# Patient Record
Sex: FEMALE | Race: WHITE | NOT HISPANIC OR LATINO | Employment: FULL TIME | URBAN - METROPOLITAN AREA
[De-identification: names, ages, dates, MRNs, and addresses within clinical notes are randomized per-mention and may not be internally consistent; named-entity substitution may affect disease eponyms.]

---

## 2024-04-29 ENCOUNTER — THERAPY VISIT (OUTPATIENT)
Dept: PHYSICAL THERAPY | Facility: CLINIC | Age: 49
End: 2024-04-29
Payer: COMMERCIAL

## 2024-04-29 DIAGNOSIS — R26.89 IMPAIRMENT OF BALANCE: ICD-10-CM

## 2024-04-29 DIAGNOSIS — G93.89 BRAIN MASS: Primary | ICD-10-CM

## 2024-04-29 DIAGNOSIS — R26.81 UNSTEADY GAIT: ICD-10-CM

## 2024-04-29 DIAGNOSIS — D33.3 RIGHT ACOUSTIC NEUROMA (H): ICD-10-CM

## 2024-04-29 PROCEDURE — 97161 PT EVAL LOW COMPLEX 20 MIN: CPT | Mod: GP | Performed by: PHYSICAL THERAPIST

## 2024-04-29 PROCEDURE — 97112 NEUROMUSCULAR REEDUCATION: CPT | Mod: GP | Performed by: PHYSICAL THERAPIST

## 2024-04-29 NOTE — PROGRESS NOTES
PHYSICAL THERAPY EVALUATION  Type of Visit: Evaluation    See electronic medical record for Abuse and Falls Screening details.    Subjective       Presenting condition or subjective complaint: vestibular therapy after surgery.   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  She underwent right frontal EVD placement on 4/11. She subsequently underwent right retrosigmoid craniotomy with tumor resection on 4/17. Postoperatively, she was noted to have right facial paresis with HB3. Postoperative MRI demonstrated good tumor resection with very small residual tumor along the right CN VII. Unfortunately, she began to have significant CSF drainage from her right frontal incision, so a left parietal  shunt placement on 4/23. Since discharging home on 4/24, she feels her balance has slowly been improving and improvement in nystagmus.  She notices weakness and fatigue since surgery.  Denies falls since surgery.  Date of onset: 04/17/24    Relevant medical history: Dizziness   Past Medical History:   Diagnosis Date    Anemia      Dates & types of surgery: april 11, 17 and 23 EVD, acoustic neuroma resection and shunt    Prior diagnostic imaging/testing results: MRI; CT scan     Prior therapy history for the same diagnosis, illness or injury:           VESTIBULAR EVALUATION  ADDITIONAL HISTORY:  Description of symptoms: Off balance  Dizzy attacks:   Start: i can feel it is improving as time passes   Last attack:     Frequency of occurrences:     Length of attack:    Difficulty hearing: Right ear  Noise in ears? Yes some white noise  not a big problem  Alleviates symptoms:    Worsens symptoms: movement and  turning  Activities that bring on symptoms:       Pertinent visual history:   Pertinent history of current vestibular problem: Hearing loss  DHI:  54/100    Prior Level of Function  Transfers: Independent  Ambulation: Independent  ADL: Independent  IADL: Driving, Housekeeping, Meal preparation    Living Environment  Social support: With a significant other or spouse   Type of home: House   Stairs to enter the home:         Ramp: No   Stairs inside the home: Yes 6 Is there a railing: Yes   Help at home: Home management tasks (cooking, cleaning); Home and Yard maintenance tasks; Assist for driving and community activities; Meals on wheels/meal service  Equipment owned:       Employment:      Hobbies/Interests:  plays the [employer] and singing at [employer].  Enjoys music, art, cooking.     Patient goals for therapy: stonger and better balance    Pain assessment: Pain denied     Objective     POSTURE: WFL  RANGE OF MOTION: LE ROM WNL  STRENGTH:  Not formally tested; >3+/5 upon observation of functional mobility  BED MOBILITY:  NT    TRANSFERS: Independent    GAIT:   Gait Description: reciprocal gait, slow speed, cautious with movements and avoided head turns    BALANCE:     SPECIAL TESTS  Functional Gait Assessment (FGA) TOTAL SCORE: (MAXIMUM SCORE 30): 13  (<22/30 indicates fall risk)     Modified CTSIB Conditions (sec) Cond 1: 30  Cond 2: 30s - moderate sway  Cond 4: 30s - moderate sway  Cond 5 : unable - immediate LOB to the left         SENSATION: LE Sensation WNL  COORDINATION: intact to rapid alt toe taps    VESTIBULAR  Cervicogenic Screen    Neck ROM Normal     Oculomotor Screen    Ocular ROM Normal   Smooth Pursuit Normal   Saccades Normal   VOR Normal   VOR Cancellation Normal   Head Impulse Test Positive on R   Convergence Testing           Dynamic Visual Acuity (DVA)    Static Acuity (LogMar) Deferred to future session   Horizontal Head Movement at 1 Hz (LogMar)    Horizontal Head Movement at 2 Hz (LogMar)              Assessment & Plan   CLINICAL IMPRESSIONS  Medical Diagnosis: Mass brain; acoustic neuroma resection    Treatment Diagnosis: impaired balance, unsteady gait   Impression/Assessment: Patient is a 48 year old female with balance and unsteady gait complaints s/p acoustic neuroma resection.  The following significant findings have been identified: Impaired balance, Impaired gait, Decreased activity tolerance, Instability, Dizziness, and Disequilibrium . These impairments interfere with their ability to perform self care tasks, recreational activities, household chores, driving , household mobility, and community mobility as compared to previous level of function.     Clinical Decision Making (Complexity):  Clinical Presentation: Stable/Uncomplicated  Clinical Presentation Rationale: based on medical and personal factors listed in PT evaluation  Clinical Decision Making (Complexity): Low complexity    PLAN OF CARE  Treatment Interventions:  Interventions: Gait Training, Neuromuscular Re-education, Therapeutic Activity, Therapeutic Exercise, Self-Care/Home Management, Standardized Testing    Long Term Goals     PT Goal 1  Goal Identifier: FGA  Goal Description: Pt will improve FGA to >/=24/30 in order to reduce fall risk and ease household and communty participation  Rationale: to maximize safety and independence within the home;to maximize safety and independence within the community  Goal Progress: baseline: 13/30  Target Date: 07/22/24  PT Goal 2  Goal Identifier: DHI  Goal Description: Pt will reduce DHI score by 20 points in order to ease ADLs and QOL  Rationale: to maximize safety and independence with performance of ADLs and functional tasks;to maximize safety and independence within the home;to maximize safety and independence within the community  Goal Progress: baseline 54/100  Target Date: 07/22/24  PT Goal 3  Goal Identifier: HEP  Goal Description: Pt will be independent with a HEP to self manage  How Severe Is This Condition?: mild symptoms  Rationale: to maximize safety and independence within the home;to maximize safety and independence within the community  Target Date: 07/22/24      Frequency of Treatment: 1x/wk  Duration of Treatment: 12 weeks    Recommended Referrals to Other Professionals:   Education Assessment:   Learner/Method: Patient;Demonstration;Pictures/Video;No Barriers to Learning  Education Comments: PT role, POC and HEP    Risks and benefits of evaluation/treatment have been explained.   Patient/Family/caregiver agrees with Plan of Care.     Evaluation Time:     PT Eval, Low Complexity Minutes (68124): 30       Signing Clinician: Bobby Michaud PT

## 2024-05-06 ENCOUNTER — THERAPY VISIT (OUTPATIENT)
Dept: PHYSICAL THERAPY | Facility: CLINIC | Age: 49
End: 2024-05-06
Payer: COMMERCIAL

## 2024-05-06 DIAGNOSIS — G93.89 BRAIN MASS: Primary | ICD-10-CM

## 2024-05-06 DIAGNOSIS — R26.81 UNSTEADY GAIT: ICD-10-CM

## 2024-05-06 DIAGNOSIS — D33.3 RIGHT ACOUSTIC NEUROMA (H): ICD-10-CM

## 2024-05-06 DIAGNOSIS — R26.89 IMPAIRMENT OF BALANCE: ICD-10-CM

## 2024-05-06 PROCEDURE — 97530 THERAPEUTIC ACTIVITIES: CPT | Mod: GP | Performed by: PHYSICAL THERAPIST

## 2024-05-06 PROCEDURE — 97112 NEUROMUSCULAR REEDUCATION: CPT | Mod: 59 | Performed by: PHYSICAL THERAPIST

## 2024-05-15 ENCOUNTER — THERAPY VISIT (OUTPATIENT)
Dept: PHYSICAL THERAPY | Facility: CLINIC | Age: 49
End: 2024-05-15
Payer: COMMERCIAL

## 2024-05-15 DIAGNOSIS — G93.89 BRAIN MASS: Primary | ICD-10-CM

## 2024-05-15 DIAGNOSIS — R26.89 IMPAIRMENT OF BALANCE: ICD-10-CM

## 2024-05-15 DIAGNOSIS — D33.3 RIGHT ACOUSTIC NEUROMA (H): ICD-10-CM

## 2024-05-15 DIAGNOSIS — R26.81 UNSTEADY GAIT: ICD-10-CM

## 2024-05-15 PROCEDURE — 97112 NEUROMUSCULAR REEDUCATION: CPT | Mod: GP | Performed by: PHYSICAL THERAPIST

## 2024-05-21 ENCOUNTER — THERAPY VISIT (OUTPATIENT)
Dept: PHYSICAL THERAPY | Facility: CLINIC | Age: 49
End: 2024-05-21
Payer: COMMERCIAL

## 2024-05-21 DIAGNOSIS — D33.3 RIGHT ACOUSTIC NEUROMA (H): ICD-10-CM

## 2024-05-21 DIAGNOSIS — G93.89 BRAIN MASS: Primary | ICD-10-CM

## 2024-05-21 DIAGNOSIS — R26.89 IMPAIRMENT OF BALANCE: ICD-10-CM

## 2024-05-21 PROCEDURE — 97112 NEUROMUSCULAR REEDUCATION: CPT | Mod: GP | Performed by: PHYSICAL THERAPIST

## 2024-05-21 PROCEDURE — 97750 PHYSICAL PERFORMANCE TEST: CPT | Mod: GP | Performed by: PHYSICAL THERAPIST

## 2024-05-21 NOTE — PROGRESS NOTES
05/21/24 1200   Signing Clinician's Name / Credentials   Signing clinician's name / credentials Delia Perez MPT   Functional Gait Assessment (MP Neely., Brenda GJasmyn F., et al. (2004))   1. GAIT LEVEL SURFACE 3   2. CHANGE IN GAIT SPEED 3   3. GAIT WITH HORIZONTAL HEAD TURNS 3   4. GAIT WITH VERTICAL HEAD TURNS 3   5. GAIT AND PIVOT TURN 2   6. STEP OVER OBSTACLE 3   7. GAIT WITH NARROW BASE OF SUPPORT 2   8. GAIT WITH EYES CLOSED 0   9. AMBULATING BACKWARDS 3   10. STEPS 3   Total Functional Gait Assessment Score   TOTAL SCORE: (MAXIMUM SCORE 30) 25

## 2024-05-30 ENCOUNTER — THERAPY VISIT (OUTPATIENT)
Dept: PHYSICAL THERAPY | Facility: CLINIC | Age: 49
End: 2024-05-30
Payer: COMMERCIAL

## 2024-05-30 DIAGNOSIS — G93.89 BRAIN MASS: Primary | ICD-10-CM

## 2024-05-30 DIAGNOSIS — R26.89 IMPAIRMENT OF BALANCE: ICD-10-CM

## 2024-05-30 DIAGNOSIS — D33.3 RIGHT ACOUSTIC NEUROMA (H): ICD-10-CM

## 2024-05-30 PROCEDURE — 97112 NEUROMUSCULAR REEDUCATION: CPT | Mod: GP

## 2024-06-03 ENCOUNTER — THERAPY VISIT (OUTPATIENT)
Dept: PHYSICAL THERAPY | Facility: CLINIC | Age: 49
End: 2024-06-03
Payer: COMMERCIAL

## 2024-06-03 DIAGNOSIS — R26.89 IMPAIRMENT OF BALANCE: ICD-10-CM

## 2024-06-03 DIAGNOSIS — D33.3 RIGHT ACOUSTIC NEUROMA (H): ICD-10-CM

## 2024-06-03 DIAGNOSIS — R26.81 UNSTEADY GAIT: ICD-10-CM

## 2024-06-03 DIAGNOSIS — G93.89 BRAIN MASS: Primary | ICD-10-CM

## 2024-06-03 PROCEDURE — 97112 NEUROMUSCULAR REEDUCATION: CPT | Mod: GP | Performed by: PHYSICAL THERAPIST

## 2024-06-17 ENCOUNTER — THERAPY VISIT (OUTPATIENT)
Dept: PHYSICAL THERAPY | Facility: CLINIC | Age: 49
End: 2024-06-17
Payer: COMMERCIAL

## 2024-06-17 DIAGNOSIS — R26.81 UNSTEADY GAIT: ICD-10-CM

## 2024-06-17 DIAGNOSIS — D33.3 RIGHT ACOUSTIC NEUROMA (H): ICD-10-CM

## 2024-06-17 DIAGNOSIS — G93.89 BRAIN MASS: Primary | ICD-10-CM

## 2024-06-17 DIAGNOSIS — R26.89 IMPAIRMENT OF BALANCE: ICD-10-CM

## 2024-06-17 PROCEDURE — 97110 THERAPEUTIC EXERCISES: CPT | Mod: GP | Performed by: PHYSICAL THERAPIST

## 2024-06-17 PROCEDURE — 97112 NEUROMUSCULAR REEDUCATION: CPT | Mod: GP | Performed by: PHYSICAL THERAPIST

## 2024-06-24 ENCOUNTER — THERAPY VISIT (OUTPATIENT)
Dept: PHYSICAL THERAPY | Facility: CLINIC | Age: 49
End: 2024-06-24
Payer: COMMERCIAL

## 2024-06-24 DIAGNOSIS — D33.3 RIGHT ACOUSTIC NEUROMA (H): ICD-10-CM

## 2024-06-24 DIAGNOSIS — R26.81 UNSTEADY GAIT: ICD-10-CM

## 2024-06-24 DIAGNOSIS — R26.89 IMPAIRMENT OF BALANCE: ICD-10-CM

## 2024-06-24 DIAGNOSIS — G93.89 BRAIN MASS: Primary | ICD-10-CM

## 2024-06-24 PROCEDURE — 97110 THERAPEUTIC EXERCISES: CPT | Mod: GP | Performed by: PHYSICAL THERAPIST

## 2024-06-24 PROCEDURE — 97750 PHYSICAL PERFORMANCE TEST: CPT | Mod: GP | Performed by: PHYSICAL THERAPIST

## 2024-06-24 NOTE — PROGRESS NOTES
06/24/24 0500   Appointment Info   Signing clinician's name / credentials Bobby Michaud, PT, DPT, NCS   Total/Authorized Visits 20 visits - hard max   Visits Used 8   Medical Diagnosis Mass brain; acoustic neuroma resection   PT Tx Diagnosis impaired balance, unsteady gait   Precautions/Limitations  shunt; no lifting >10# for 4-6 weeks and avoid excessive activity for 4 weeks   Progress Note/Certification   Onset of illness/injury or Date of Surgery 04/17/24   Therapy Frequency 1x/wk   Predicted Duration 12 weeks   Progress Note Due Date 07/22/24   GOALS   PT Goals 2;3   PT Goal 1   Goal Identifier FGA   Goal Description Pt will improve FGA to >/=24/30 in order to reduce fall risk and ease household and communty participation   Rationale to maximize safety and independence within the home;to maximize safety and independence within the community   Goal Progress MET: Pt progressed from 13/30 to 25/30 to 29/30. Pt now scores low fall risk.   Target Date 07/22/24   Date Met 05/21/24   PT Goal 2   Goal Identifier DHI   Goal Description Pt will reduce DHI score by 20 points in order to ease ADLs and QOL   Rationale to maximize safety and independence with performance of ADLs and functional tasks;to maximize safety and independence within the home;to maximize safety and independence within the community   Goal Progress MET:  54/100 to 8/100.  She notes significant improvement in her dizziness symptoms and is able to tolerate daily activities and even able to climb a step stool and paint   Target Date 07/22/24   Date Met 06/24/24   PT Goal 3   Goal Identifier HEP   Goal Description Pt will be independent with a HEP to self manage symptoms   Rationale to maximize safety and independence within the home;to maximize safety and independence within the community   Goal Progress MET: Pt has been issues a comprehensive HEP for ongoing self management   Target Date 07/22/24   Date Met 06/24/24   Subjective Report    Subjective Report Pt notes that she has been able to stand on a step stool and paint a room since last session. She went swimming and notes that she gets pressure when trying to keep her head above water in cervical extension.  She has been compliance with her HEP and feels her legs are getting stronger.  Since coming to therapy her balance has greatly improved and she is starting to do things that she was unable to do before surgery   Therapeutic Procedure/Exercise   Therapeutic Procedures: strength, endurance, ROM, flexibility minutes (72944) 28   Ther Proc 1 - Details Squats with side leg lift x 20 reps alternating - SBA for balance. Squats with alt back kick x 20 reps - improved balance and good form. Narrow PATRICIA squats x 10 reps. Split stance squats x 8 reps each side - L knee discomfort therefore discontinued.  UB strengthening: wall push ups x 15 reps - VC for breathing and form.  Standing bicep curls with 4# weights B x 15 reps, - good form.  Standing overhead tricep press with 4# weight x 10 reps - good form. Forward lead/support tricep press x 10 reps each side with 4# weight - good form.  Pt instructed to focus on breathing with all strengthening exercises and avoid valsalva manuever due to  stunt.  Encouraged continue gradual increase in activity and aerobic exercise as tolerated and questions regarding dance classes.  Pt encouraged continue performance of balance activities and head turns.    Encouraged discussion with MD regarding if there are any restrictions for cervical activity or massage as well as discussion regarding increased head pressure with cervical extension.   Skilled Intervention Generalized strengthening in order to assist with carryover to balance stability   Patient Response/Progress Pt demonstrated good form with strengthening exercises   Physical Performance Test/measures   Physical Performance Test/Measurement, Minutes (08853) 10   Physical Performance Test/Measurement Details  FGA 29/30   Skilled Intervention In order to assess progress in balance   Patient Response/Progress significant improvement with balance as noted above.   Education   Learner/Method Patient;Listening;Demonstration;Pictures/Video;No Barriers to Learning   Education Comments HEP, progress during therapy   Plan   Plan for next session discharge from PT   Total Session Time   Timed Code Treatment Minutes 38   Total Treatment Time (sum of timed and untimed services) 38         DISCHARGE  Reason for Discharge: Patient has met all goals.    Equipment Issued: none    Discharge Plan: Patient to continue home program.    Referring Provider:  Eber Randhawa Pe*